# Patient Record
Sex: FEMALE | Race: BLACK OR AFRICAN AMERICAN | URBAN - METROPOLITAN AREA
[De-identification: names, ages, dates, MRNs, and addresses within clinical notes are randomized per-mention and may not be internally consistent; named-entity substitution may affect disease eponyms.]

---

## 2023-09-15 ENCOUNTER — OFFICE VISIT (OUTPATIENT)
Age: 38
End: 2023-09-15
Payer: COMMERCIAL

## 2023-09-15 VITALS
RESPIRATION RATE: 16 BRPM | DIASTOLIC BLOOD PRESSURE: 70 MMHG | OXYGEN SATURATION: 98 % | HEIGHT: 67 IN | HEART RATE: 99 BPM | SYSTOLIC BLOOD PRESSURE: 122 MMHG | BODY MASS INDEX: 25.43 KG/M2 | WEIGHT: 162 LBS

## 2023-09-15 DIAGNOSIS — R42 VERTIGO: ICD-10-CM

## 2023-09-15 DIAGNOSIS — H91.91 DECREASED HEARING, RIGHT: Primary | ICD-10-CM

## 2023-09-15 DIAGNOSIS — H93.11 RIGHT-SIDED TINNITUS: ICD-10-CM

## 2023-09-15 PROCEDURE — 99202 OFFICE O/P NEW SF 15 MIN: CPT | Performed by: OTOLARYNGOLOGY

## 2023-09-15 RX ORDER — LORATADINE 10 MG/1
CAPSULE, LIQUID FILLED ORAL
COMMUNITY
Start: 2023-01-01

## 2023-09-15 RX ORDER — NORETHINDRONE ACETATE AND ETHINYL ESTRADIOL 1.5; 3 MG/1; UG/1
1 TABLET ORAL DAILY
COMMUNITY
Start: 2023-08-18

## 2023-09-15 NOTE — PROGRESS NOTES
Otolaryngology-Head and Neck Surgery  New Patient Visit     Patient: Jose Alberto Godinez  YOB: 1985  MRN: [id number]  Date of Service: 9/15/2023    Chief Complaint:   Chief Complaint   Patient presents with    New Patient     Clogged ears    Dizziness         History of Present Illness: Jose Alberto Godinez is a 34 y.o. female who presents today for discussion of her ear    About 1 month ago developed dizziness with nausea and emesis lasting for hours  She was left with right ear tinnitus, hearing distortion and plugging    Seen in urgent care x 2 and given Rx for steroids and antibiotics, unsure which     Did not seem to help much    No pain  No otorrhea  No prior ear infection hx     Past Medical History:  No past medical history on file. Past Surgical History:   No past surgical history on file. Medications:   No current outpatient medications    Allergies:   Not on File    Social History:         Family History:  No family history on file. Review of Systems:    Consitutional: denies fever, excessive weight gain or loss. Eyes: denies diplopia, eye pain. Integumentary: denies new concerning skin lesions. Ears, Nose, Mouth, Throat: denies except as per HPI. Endocrine: denies hot or cold intolerance, increased thirst.  Respiratory: denies cough, hemoptysis, wheezing  Gastrointestinal: denies trouble swallowing, nausea, emesis, regurgitation  Musculoskeletal: denies muscle weakness or wasting  Cardiovascular: denies chest pain, shortness of breath  Neurologic: denies seizures, numbness or tingling, syncope  Hematologic: denies easy bleeding or bruising    Physical Examination  There were no vitals taken for this visit. General: Comfortable, pleasant, appears stated age  Voice: Strong, speaking in full sentences, no stridor    Face: No masses or lesions, facial strength symmetric   Ears: External ears unremarkable. Bilateral ear canal clear. Tympanic membrane clear and intact, with visible landmarks.

## 2023-09-18 ENCOUNTER — OFFICE VISIT (OUTPATIENT)
Age: 38
End: 2023-09-18
Payer: COMMERCIAL

## 2023-09-18 VITALS
HEART RATE: 94 BPM | OXYGEN SATURATION: 99 % | BODY MASS INDEX: 25.9 KG/M2 | SYSTOLIC BLOOD PRESSURE: 126 MMHG | WEIGHT: 165 LBS | DIASTOLIC BLOOD PRESSURE: 70 MMHG | RESPIRATION RATE: 16 BRPM | HEIGHT: 67 IN

## 2023-09-18 DIAGNOSIS — H90.3 ASYMMETRIC SNHL (SENSORINEURAL HEARING LOSS): ICD-10-CM

## 2023-09-18 DIAGNOSIS — H93.11 RIGHT-SIDED TINNITUS: ICD-10-CM

## 2023-09-18 DIAGNOSIS — H90.41 SENSORINEURAL HEARING LOSS (SNHL) OF RIGHT EAR WITH UNRESTRICTED HEARING OF LEFT EAR: Primary | ICD-10-CM

## 2023-09-18 DIAGNOSIS — H91.21 SUDDEN RIGHT HEARING LOSS: Primary | ICD-10-CM

## 2023-09-18 PROCEDURE — 92557 COMPREHENSIVE HEARING TEST: CPT | Performed by: AUDIOLOGIST

## 2023-09-18 PROCEDURE — 92550 TYMPANOMETRY & REFLEX THRESH: CPT | Performed by: AUDIOLOGIST

## 2023-09-18 PROCEDURE — 99213 OFFICE O/P EST LOW 20 MIN: CPT | Performed by: OTOLARYNGOLOGY

## 2023-09-18 NOTE — PROGRESS NOTES
Ida Beck, a 34y.o. year old female, was seen in ENT clinic today for a hearing evaluation on referral from Dr. Evette Chong. Patient complains of tinnitus. She also previously saw Dr. Evette Chong on 9- for dizziness; she reports this is resolved now but is still experiencing issues hearing. She reports \"white noise,\" \"echoes,\" and \"tones\" in the right ear and states that her left ear sounds \"muffled. \"    Otoscopy: normal external ear canals and visible tympanic membranes, bilaterally. Tympanometry: RE Type A, normal  LE Type A, normal    Acoustic reflexes, 500-4000Hz:    RE ipsi: absent at all tested frequencies       LE ipsi: present 500-2000Hz         RE contra (phone L): present 500-2000Hz      LE contra (phone R): absent at all tested frequencies    SRT: RE Speech Awareness Threshold (SAT) was observed at 65 dBHL   LE Speech Reception Threshold (SRT) was obtained at 5 dBHL    WRS: RE Poor in quiet when words were presented at 100 dBHL. (60 dBHL contralateral masking)  WRS: LE Excellent in quiet when words were presented at 45 dBHL. Pure tone audiometry:  RE: flat severe sensorineural hearing loss (moderate at 250Hz)  LE: WNL    sensorineural hearing loss right. Андрей test negative at 1000Hz    Impressions:  hearing loss requiring medical/otologic and audiologic follow-up    Plan:  Follow-up with ENT. Refer for CROS or CI evaluation following clearance. Repeat audiogram 1 year or sooner if change is noted.     Ary aNscimento   Doctor of Audiology

## 2023-09-19 RX ORDER — PREDNISONE 20 MG/1
TABLET ORAL
Qty: 28 TABLET | Refills: 0 | Status: SHIPPED | OUTPATIENT
Start: 2023-09-19

## 2023-09-19 NOTE — PROGRESS NOTES
Otolaryngology-Head and Neck Surgery  Follow Up Patient Visit     Patient: Monse Barron  YOB: 1985  MRN: [id number]  Date of Service: 9/18/2023    Chief Complaint:   Chief Complaint   Patient presents with    Follow-up     Hearing test     Interval hx 9/19/2023  Had audiogram    History of Present Illness: Monse Barron is a 34 y.o. female who presents today for discussion of her ear    About 1 month ago developed dizziness with nausea and emesis lasting for hours  She was left with right ear tinnitus, hearing distortion and plugging    Seen in urgent care x 2 and given Rx for steroids and antibiotics, unsure which     Did not seem to help much    No pain  No otorrhea  No prior ear infection hx     Past Medical History:  Past Medical History:   Diagnosis Date    Hearing loss     When I was around 6yrs old the pediatricia saw scarring in one of my ears and said it was from ear infections. Though before then, my mother wasnt aware of me having any. He said I probably had some hearing loss but was not tested. Hearing seemed fine. Past Surgical History:   History reviewed. No pertinent surgical history. Medications:   Current Outpatient Medications   Medication Instructions    JUNEL 1.5/30 1.5-30 MG-MCG TABS 1 tablet, Oral, DAILY    loratadine (CLARITIN) 10 MG capsule No dose, route, or frequency recorded. Allergies:   No Known Allergies    Social History:   Social History     Tobacco Use    Smoking status: Never    Smokeless tobacco: Never   Substance Use Topics    Alcohol use: Never    Drug use: Never        Family History:  Family History   Problem Relation Age of Onset    Cancer Father     Cancer Maternal Grandfather     Heart Failure Maternal Grandfather     Hearing Loss Maternal Grandmother     Diabetes Paternal Grandfather     Heart Failure Paternal Grandfather        Review of Systems:    Consitutional: denies fever, excessive weight gain or loss.   Eyes: denies diplopia, eye

## 2023-09-25 ENCOUNTER — HOSPITAL ENCOUNTER (OUTPATIENT)
Facility: HOSPITAL | Age: 38
Discharge: HOME | End: 2023-09-28
Attending: OTOLARYNGOLOGY
Payer: COMMERCIAL

## 2023-09-25 VITALS — WEIGHT: 162 LBS | BODY MASS INDEX: 25.37 KG/M2

## 2023-09-25 DIAGNOSIS — H91.21 SUDDEN RIGHT HEARING LOSS: ICD-10-CM

## 2023-09-25 DIAGNOSIS — H90.3 ASYMMETRIC SNHL (SENSORINEURAL HEARING LOSS): ICD-10-CM

## 2023-09-25 PROCEDURE — A9579 GAD-BASE MR CONTRAST NOS,1ML: HCPCS | Performed by: RADIOLOGY

## 2023-09-25 PROCEDURE — 70553 MRI BRAIN STEM W/O & W/DYE: CPT

## 2023-09-25 PROCEDURE — 6360000004 HC RX CONTRAST MEDICATION: Performed by: RADIOLOGY

## 2023-09-25 RX ADMIN — GADOTERIDOL 14 ML: 279.3 INJECTION, SOLUTION INTRAVENOUS at 08:09

## 2023-10-06 ENCOUNTER — OFFICE VISIT (OUTPATIENT)
Age: 38
End: 2023-10-06
Payer: COMMERCIAL

## 2023-10-06 VITALS
RESPIRATION RATE: 16 BRPM | DIASTOLIC BLOOD PRESSURE: 66 MMHG | BODY MASS INDEX: 25.27 KG/M2 | HEART RATE: 99 BPM | HEIGHT: 67 IN | SYSTOLIC BLOOD PRESSURE: 122 MMHG | WEIGHT: 161 LBS | OXYGEN SATURATION: 99 %

## 2023-10-06 DIAGNOSIS — H91.21 SUDDEN RIGHT HEARING LOSS: Primary | ICD-10-CM

## 2023-10-06 DIAGNOSIS — H90.3 ASYMMETRIC SNHL (SENSORINEURAL HEARING LOSS): ICD-10-CM

## 2023-10-06 DIAGNOSIS — H93.11 RIGHT-SIDED TINNITUS: ICD-10-CM

## 2023-10-06 PROCEDURE — 69801 INCISE INNER EAR: CPT | Performed by: OTOLARYNGOLOGY

## 2023-10-06 PROCEDURE — 99213 OFFICE O/P EST LOW 20 MIN: CPT | Performed by: OTOLARYNGOLOGY

## 2023-10-10 NOTE — PROGRESS NOTES
Otolaryngology-Head and Neck Surgery  Follow Up Patient Visit     Patient: Joe Mancini  YOB: 1985  MRN: [id number]  Date of Service: 10/6/2023    Chief Complaint:   Chief Complaint   Patient presents with    Follow-up     Sudden right hearing loss     Interval hx 10/6/2023  Completed PO prednisone without change in hearing  Had MRI IAC     History of Present Illness: Joe Mancini is a 34 y.o. female who presents today for discussion of her ear    About 1 month ago developed dizziness with nausea and emesis lasting for hours  She was left with right ear tinnitus, hearing distortion and plugging    Seen in urgent care x 2 and given Rx for steroids and antibiotics, unsure which     Did not seem to help much    No pain  No otorrhea  No prior ear infection hx     Past Medical History:  Past Medical History:   Diagnosis Date    Hearing loss     When I was around 6yrs old the pediatricia saw scarring in one of my ears and said it was from ear infections. Though before then, my mother wasnt aware of me having any. He said I probably had some hearing loss but was not tested. Hearing seemed fine. Past Surgical History:   History reviewed. No pertinent surgical history. Medications:   Current Outpatient Medications   Medication Instructions    JUNEL 1.5/30 1.5-30 MG-MCG TABS 1 tablet, Oral, DAILY    loratadine (CLARITIN) 10 MG capsule No dose, route, or frequency recorded.     predniSONE (DELTASONE) 20 MG tablet Take 60 mg (3 tabs) on days 1-7, then take 40 mg x 2 days, 20 mg x 2 days, 10 mg (1/2 tab) x 2 days then stop       Allergies:   No Known Allergies    Social History:   Social History     Tobacco Use    Smoking status: Never    Smokeless tobacco: Never   Substance Use Topics    Alcohol use: Never    Drug use: Never        Family History:  Family History   Problem Relation Age of Onset    Cancer Father     Cancer Maternal Grandfather     Heart Failure Maternal Grandfather     Hearing Loss Maternal

## 2023-10-11 RX ORDER — DEXAMETHASONE SODIUM PHOSPHATE 10 MG/ML
10 INJECTION INTRAMUSCULAR; INTRAVENOUS ONCE
Qty: 1 ML | Refills: 0
Start: 2023-10-11 | End: 2023-10-11

## 2023-10-13 ENCOUNTER — OFFICE VISIT (OUTPATIENT)
Age: 38
End: 2023-10-13

## 2023-10-13 VITALS
RESPIRATION RATE: 16 BRPM | SYSTOLIC BLOOD PRESSURE: 124 MMHG | HEIGHT: 67 IN | DIASTOLIC BLOOD PRESSURE: 60 MMHG | OXYGEN SATURATION: 98 % | HEART RATE: 99 BPM | BODY MASS INDEX: 25.27 KG/M2 | WEIGHT: 161 LBS

## 2023-10-13 DIAGNOSIS — H91.21 SUDDEN RIGHT HEARING LOSS: Primary | ICD-10-CM

## 2023-10-13 NOTE — PROGRESS NOTES
Tabatha Latesha  1985  [id number]    10/13/2023    PROCEDURE: Right intratympanic steroid injection     Preoperative diagnosis: Sudden R SNHL   Postoperative diagnosis: Same as above     Risks, benefits and alternatives were reviewed including but not limited to risk of bleeding, infection, vertigo, TM perforation, hearing loss, no improvement in hearing. Informed consent was obtained and the patient agreed to the procedure. Under microscopy, the R  ear was inspected with a speculum. Any obstructive cerumen or debris was removed. 0.5 ml of dexamethasone 10 mg/ml was infiltrated into the R ear through the pre-existing perforation. The patient was positioned supine with head turned for 15-20 minutes. The patient tolerated the procedure well.     Ania Barajas MD   [address] [place] [address] ENT & Allergy  [address] [place] [address] [address] [place] [address]  Office Phone: [phone]

## 2023-10-20 ENCOUNTER — OFFICE VISIT (OUTPATIENT)
Age: 38
End: 2023-10-20

## 2023-10-20 VITALS
SYSTOLIC BLOOD PRESSURE: 118 MMHG | DIASTOLIC BLOOD PRESSURE: 64 MMHG | OXYGEN SATURATION: 98 % | BODY MASS INDEX: 25.9 KG/M2 | HEART RATE: 86 BPM | RESPIRATION RATE: 16 BRPM | HEIGHT: 67 IN | WEIGHT: 165 LBS

## 2023-10-20 DIAGNOSIS — H91.21 SUDDEN RIGHT HEARING LOSS: Primary | ICD-10-CM

## 2023-10-20 NOTE — PROGRESS NOTES
Diane Viera  1985  [id number]    10/20/2023    PROCEDURE: Right intratympanic steroid injection     Preoperative diagnosis: Sudden R SNHL   Postoperative diagnosis: Same as above     Risks, benefits and alternatives were reviewed including but not limited to risk of bleeding, infection, vertigo, TM perforation, hearing loss, no improvement in hearing. Informed consent was obtained and the patient agreed to the procedure. Under microscopy, the R  ear was inspected with a speculum. Any obstructive cerumen or debris was removed. A drop of phenol was applied to the ear drum inferiorly. 0.5 ml of dexamethasone 10 mg/ml was infiltrated into the R ear. The patient was positioned supine with head turned for 15-20 minutes. The patient tolerated the procedure well.     Lindy Olivier MD   [address] [place] [address] ENT & Allergy  [address] [place] [address] [address] [place] [address]  Office Phone: [phone]

## 2023-12-11 ENCOUNTER — OFFICE VISIT (OUTPATIENT)
Age: 38
End: 2023-12-11
Payer: COMMERCIAL

## 2023-12-11 VITALS
OXYGEN SATURATION: 98 % | HEART RATE: 92 BPM | DIASTOLIC BLOOD PRESSURE: 78 MMHG | BODY MASS INDEX: 25.9 KG/M2 | HEIGHT: 67 IN | SYSTOLIC BLOOD PRESSURE: 118 MMHG | WEIGHT: 165 LBS

## 2023-12-11 DIAGNOSIS — H90.41 SENSORINEURAL HEARING LOSS (SNHL) OF RIGHT EAR WITH UNRESTRICTED HEARING OF LEFT EAR: Primary | ICD-10-CM

## 2023-12-11 DIAGNOSIS — H90.3 ASYMMETRIC SNHL (SENSORINEURAL HEARING LOSS): ICD-10-CM

## 2023-12-11 DIAGNOSIS — H93.11 RIGHT-SIDED TINNITUS: ICD-10-CM

## 2023-12-11 PROCEDURE — 99213 OFFICE O/P EST LOW 20 MIN: CPT | Performed by: OTOLARYNGOLOGY

## 2023-12-11 PROCEDURE — 92557 COMPREHENSIVE HEARING TEST: CPT | Performed by: AUDIOLOGIST

## 2023-12-11 NOTE — PROGRESS NOTES
discussed possibility of CROS or cochlear implant evaluation.         Ary Montero  Audiologist    Chart CC'd to: Nubia Cox MD      Degree of   Hearing Sensitivity dB Range   Within Normal Limits (WNL) 0 - 20   Mild 20 - 40   Moderate 40 - 55   Moderately-Severe 55 - 70   Severe 70 - 90   Profound 90 +

## 2023-12-12 NOTE — PROGRESS NOTES
MRI.      Assessment and Plan:   Sudden R SNHL  Asymmetric SNHL  R tinnitus  - Discussed ear exam is normal without effusion or issue to account for hearing symptoms  - Audiogram shows significant R SNHL   - No sig improvement with PO prednisone or IT steroid injection  - Low change hearing may improve at this point  - Discussed options including cont obs, role of CROS aid, consideration of CI  - Will arrange referral to [facility] otology to discuss CI further      The patient was instructed to return to clinic if no improvement or progression of symptoms. Signs to watch out for reviewed.       Erendira Fox MD   [address] [place] [address] ENT & Allergy  [address] [place] [address] [address]  [facility] [address]  Office Phone: [phone]

## 2024-01-05 ENCOUNTER — TELEPHONE (OUTPATIENT)
Age: 39
End: 2024-01-05

## 2024-01-17 ENCOUNTER — TELEPHONE (OUTPATIENT)
Facility: CLINIC | Age: 39
End: 2024-01-17

## 2024-01-17 NOTE — TELEPHONE ENCOUNTER
----- Message from Lillie Ira sent at 1/16/2024  4:05 PM EST -----  Subject: Appointment Request    Reason for Call: New Patient/New to Provider Appointment needed: New   Patient Request Appointment    QUESTIONS    Reason for appointment request? No appointments available during search     Additional Information for Provider? patient would like to be a new   patient with Dr. Walden. please call her back if that can or cannot   happen.   ---------------------------------------------------------------------------  --------------  CALL BACK INFO  [phone]; OK to leave message on voicemail  ---------------------------------------------------------------------------  --------------  SCRIPT ANSWERS

## 2024-02-26 ENCOUNTER — OFFICE VISIT (OUTPATIENT)
Age: 39
End: 2024-02-26
Payer: COMMERCIAL

## 2024-02-26 VITALS
BODY MASS INDEX: 25.27 KG/M2 | HEIGHT: 67 IN | RESPIRATION RATE: 16 BRPM | WEIGHT: 161 LBS | OXYGEN SATURATION: 99 % | DIASTOLIC BLOOD PRESSURE: 74 MMHG | SYSTOLIC BLOOD PRESSURE: 128 MMHG | HEART RATE: 90 BPM

## 2024-02-26 DIAGNOSIS — H91.21 SUDDEN RIGHT HEARING LOSS: Primary | ICD-10-CM

## 2024-02-26 DIAGNOSIS — H93.8X2 PLUGGED FEELING IN EAR, LEFT: ICD-10-CM

## 2024-02-26 DIAGNOSIS — H90.3 ASYMMETRIC SNHL (SENSORINEURAL HEARING LOSS): ICD-10-CM

## 2024-02-26 PROCEDURE — 99213 OFFICE O/P EST LOW 20 MIN: CPT | Performed by: OTOLARYNGOLOGY

## 2024-02-26 RX ORDER — FLUTICASONE PROPIONATE 50 MCG
1 SPRAY, SUSPENSION (ML) NASAL DAILY
Qty: 16 G | Refills: 2 | Status: SHIPPED | OUTPATIENT
Start: 2024-02-26

## 2024-02-26 RX ORDER — FLUOCINOLONE ACETONIDE 0.11 MG/ML
OIL AURICULAR (OTIC)
Qty: 1 EACH | Refills: 0 | Status: SHIPPED | OUTPATIENT
Start: 2024-02-26

## 2024-02-27 NOTE — PROGRESS NOTES
Otolaryngology-Head and Neck Surgery  Follow Up Patient Visit     Patient: Judy Hardin  YOB: 1985  MRN: [id number]  Date of Service: 2/26/2024        Chief Complaint:   Chief Complaint   Patient presents with    Follow-up    Ear Problem     Right ear     Interval hx 2/26/2024  Hearing unchanged in the right ear  Has a follow-up with [facility] otology in the next couple of months    About a month ago she tried using earplugs in her left ear while at work to help with hyperacusis  Immediately thereafter she developed left ear itching and left ear plugging  Despite stopping use of the earbud, she continues to have intermittent itching and plugging  Hearing seems to be normal    Interval hx 12/10/2023  Completed PO prednisone and 3 IT steroid injections  Does not feel much change     Interval hx 10/6/2023  Completed PO prednisone without change in hearing  Had MRI IAC     History of Present Illness: Judy Hardin is a 38 y.o. female who presents today for discussion of her ear    About 1 month ago developed dizziness with nausea and emesis lasting for hours  She was left with right ear tinnitus, hearing distortion and plugging    Seen in urgent care x 2 and given Rx for steroids and antibiotics, unsure which     Did not seem to help much    No pain  No otorrhea  No prior ear infection hx     Past Medical History:  Past Medical History:   Diagnosis Date    Hearing loss     When I was around 6yrs old the pediatricia saw scarring in one of my ears and said it was from ear infections. Though before then, my mother wasnt aware of me having any. He said I probably had some hearing loss but was not tested. Hearing seemed fine.       Past Surgical History:   History reviewed. No pertinent surgical history.    Medications:   Current Outpatient Medications   Medication Instructions    fluocinolone (DERMOTIC) 0.01 % OIL oil 1-2 drops left ear BID x 7 days, then use sparingly every few weeks PRN itching    fluticasone

## 2024-06-17 ENCOUNTER — OFFICE VISIT (OUTPATIENT)
Facility: CLINIC | Age: 39
End: 2024-06-17
Payer: COMMERCIAL

## 2024-06-17 VITALS
WEIGHT: 159 LBS | OXYGEN SATURATION: 97 % | TEMPERATURE: 99 F | HEIGHT: 68 IN | HEART RATE: 97 BPM | SYSTOLIC BLOOD PRESSURE: 116 MMHG | BODY MASS INDEX: 24.1 KG/M2 | DIASTOLIC BLOOD PRESSURE: 77 MMHG

## 2024-06-17 DIAGNOSIS — Z00.01 ENCOUNTER FOR WELL ADULT EXAM WITH ABNORMAL FINDINGS: ICD-10-CM

## 2024-06-17 DIAGNOSIS — H90.41 SENSORINEURAL HEARING LOSS (SNHL) OF RIGHT EAR WITH UNRESTRICTED HEARING OF LEFT EAR: ICD-10-CM

## 2024-06-17 DIAGNOSIS — J06.9 ACUTE URI: Primary | ICD-10-CM

## 2024-06-17 PROBLEM — H90.5 SENSORINEURAL HEARING LOSS (SNHL) OF RIGHT EAR: Status: ACTIVE | Noted: 2024-06-17

## 2024-06-17 PROCEDURE — 99203 OFFICE O/P NEW LOW 30 MIN: CPT | Performed by: INTERNAL MEDICINE

## 2024-06-17 RX ORDER — VIT C/ZINC CITRATE/ELDERBERRY 45 MG-50MG
2 TABLET,CHEWABLE ORAL DAILY
COMMUNITY

## 2024-06-17 RX ORDER — BIOTIN 1000 MCG
2 TABLET,CHEWABLE ORAL DAILY
COMMUNITY

## 2024-06-17 RX ORDER — GUAIFENESIN 400 MG/1
400 TABLET ORAL 4 TIMES DAILY PRN
COMMUNITY

## 2024-06-17 ASSESSMENT — ENCOUNTER SYMPTOMS
SHORTNESS OF BREATH: 0
NAUSEA: 0
DIARRHEA: 0
COUGH: 0
VOMITING: 0
ABDOMINAL PAIN: 0

## 2024-06-17 ASSESSMENT — PATIENT HEALTH QUESTIONNAIRE - PHQ9
SUM OF ALL RESPONSES TO PHQ QUESTIONS 1-9: 0
SUM OF ALL RESPONSES TO PHQ9 QUESTIONS 1 & 2: 0
SUM OF ALL RESPONSES TO PHQ QUESTIONS 1-9: 0
2. FEELING DOWN, DEPRESSED OR HOPELESS: NOT AT ALL
SUM OF ALL RESPONSES TO PHQ QUESTIONS 1-9: 0
1. LITTLE INTEREST OR PLEASURE IN DOING THINGS: NOT AT ALL
SUM OF ALL RESPONSES TO PHQ QUESTIONS 1-9: 0

## 2024-06-17 NOTE — ASSESSMENT & PLAN NOTE
Viral nature of the disease discussed with the patient.  Advised sinus rinse and saline gargle. Can take Tylenol or aleve for discomfort,Delsym for cough ,zyrtec or allegra or coricidine  for congestion.Encouraged to do steam inhalation and increasing fluid intake.

## 2024-06-17 NOTE — PROGRESS NOTES
Chief Complaint   Patient presents with    New Patient    /77 (Site: Right Upper Arm, Position: Sitting, Cuff Size: Small Adult)   Pulse 97   Temp 99 °F (37.2 °C) (Oral)   Ht 1.715 m (5' 7.5\")   Wt 72.1 kg (159 lb)   SpO2 97%   BMI 24.54 kg/m²  1. Have you been to the ER, urgent care clinic since your last visit?  Hospitalized since your last visit?No    2. Have you seen or consulted any other health care providers outside of the [facility] since your last visit?  Include any pap smears or colon screening. No    
    Hearing Loss Maternal Cousin     Learning Disabilities Maternal Cousin     Miscarriages / Stillbirths Maternal Aunt     Osteoporosis Maternal Aunt         Past Surgical History:   Procedure Laterality Date    EYE SURGERY  08/1985    Corrective eye surgery for strabismus       Social History     Tobacco Use    Smoking status: Never    Smokeless tobacco: Never   Substance Use Topics    Alcohol use: Never    Drug use: Never         Review of Systems   Constitutional:  Negative for appetite change and fatigue.   HENT:  Positive for congestion and hearing loss.    Eyes:  Negative for visual disturbance.   Respiratory:  Negative for cough and shortness of breath.    Cardiovascular:  Negative for chest pain and leg swelling.   Gastrointestinal:  Negative for abdominal pain, diarrhea, nausea and vomiting.   Genitourinary:  Negative for dysuria.   Skin:  Negative for rash.   Neurological:  Negative for tremors and headaches.   Psychiatric/Behavioral:  The patient is not nervous/anxious.        /77 (Site: Right Upper Arm, Position: Sitting, Cuff Size: Small Adult)   Pulse 97   Temp 99 °F (37.2 °C) (Oral)   Ht 1.715 m (5' 7.5\")   Wt 72.1 kg (159 lb)   SpO2 97%   BMI 24.54 kg/m²      Physical Exam  Vitals and nursing note reviewed.    Gen:  Not  in no acute distress. Well built and nourished.  HEENT:  Chisana conjunctivae, SARAI, EOMI, hearing decreased rt. -ve sinus tenderness.  Mouth: Moist mucous membranes. No TPC  Neck:  Supple, without masses, thyroid not enlarged  Resp:  No accessory muscle use, clear breath sounds without wheezes rales or rhonchi  Card:  No murmurs, normal S1, S2 without thrills, bruits or peripheral edema  Abd:  Soft, non-tender, non-distended, normoactive bowel sounds are present, no palpable organomegaly and no detectable hernias  Lymph:  No cervical or inguinal adenopathy  Musc:  No cyanosis or clubbing.   Gait: Normal  Skin:  No rashes or ulcers, skin turgor is good  Neuro: Alert and

## 2025-06-05 LAB
ALBUMIN SERPL-MCNC: 4.3 G/DL (ref 3.9–4.9)
ALP SERPL-CCNC: 52 IU/L (ref 44–121)
ALT SERPL-CCNC: 20 IU/L (ref 0–32)
AST SERPL-CCNC: 24 IU/L (ref 0–40)
BASOPHILS # BLD AUTO: 0 X10E3/UL (ref 0–0.2)
BASOPHILS NFR BLD AUTO: 0 %
BILIRUB SERPL-MCNC: 0.4 MG/DL (ref 0–1.2)
BUN SERPL-MCNC: 9 MG/DL (ref 6–24)
BUN/CREAT SERPL: 13 (ref 9–23)
CALCIUM SERPL-MCNC: 9.6 MG/DL (ref 8.7–10.2)
CHLORIDE SERPL-SCNC: 102 MMOL/L (ref 96–106)
CHOLEST SERPL-MCNC: 195 MG/DL (ref 100–199)
CO2 SERPL-SCNC: 22 MMOL/L (ref 20–29)
CREAT SERPL-MCNC: 0.7 MG/DL (ref 0.57–1)
EGFRCR SERPLBLD CKD-EPI 2021: 111 ML/MIN/1.73
EOSINOPHIL # BLD AUTO: 0 X10E3/UL (ref 0–0.4)
EOSINOPHIL NFR BLD AUTO: 1 %
ERYTHROCYTE [DISTWIDTH] IN BLOOD BY AUTOMATED COUNT: 12.8 % (ref 11.7–15.4)
GLOBULIN SER CALC-MCNC: 3.1 G/DL (ref 1.5–4.5)
GLUCOSE SERPL-MCNC: 102 MG/DL (ref 70–99)
HBA1C MFR BLD: 5.5 % (ref 4.8–5.6)
HCT VFR BLD AUTO: 42.5 % (ref 34–46.6)
HDLC SERPL-MCNC: 53 MG/DL
HGB BLD-MCNC: 13.3 G/DL (ref 11.1–15.9)
IMM GRANULOCYTES # BLD AUTO: 0 X10E3/UL (ref 0–0.1)
IMM GRANULOCYTES NFR BLD AUTO: 0 %
LDLC SERPL CALC-MCNC: 125 MG/DL (ref 0–99)
LYMPHOCYTES # BLD AUTO: 1.5 X10E3/UL (ref 0.7–3.1)
LYMPHOCYTES NFR BLD AUTO: 47 %
MCH RBC QN AUTO: 25.6 PG (ref 26.6–33)
MCHC RBC AUTO-ENTMCNC: 31.3 G/DL (ref 31.5–35.7)
MCV RBC AUTO: 82 FL (ref 79–97)
MONOCYTES # BLD AUTO: 0.3 X10E3/UL (ref 0.1–0.9)
MONOCYTES NFR BLD AUTO: 10 %
NEUTROPHILS # BLD AUTO: 1.3 X10E3/UL (ref 1.4–7)
NEUTROPHILS NFR BLD AUTO: 42 %
PLATELET # BLD AUTO: 221 X10E3/UL (ref 150–450)
POTASSIUM SERPL-SCNC: 4.5 MMOL/L (ref 3.5–5.2)
PROT SERPL-MCNC: 7.4 G/DL (ref 6–8.5)
RBC # BLD AUTO: 5.19 X10E6/UL (ref 3.77–5.28)
SODIUM SERPL-SCNC: 138 MMOL/L (ref 134–144)
TRIGL SERPL-MCNC: 96 MG/DL (ref 0–149)
TSH SERPL DL<=0.005 MIU/L-ACNC: 1.87 UIU/ML (ref 0.45–4.5)
VLDLC SERPL CALC-MCNC: 17 MG/DL (ref 5–40)
WBC # BLD AUTO: 3.1 X10E3/UL (ref 3.4–10.8)

## 2025-06-08 ENCOUNTER — RESULTS FOLLOW-UP (OUTPATIENT)
Facility: CLINIC | Age: 40
End: 2025-06-08

## 2025-07-05 ASSESSMENT — PATIENT HEALTH QUESTIONNAIRE - PHQ9
SUM OF ALL RESPONSES TO PHQ9 QUESTIONS 1 & 2: 2
SUM OF ALL RESPONSES TO PHQ QUESTIONS 1-9: 2
2. FEELING DOWN, DEPRESSED OR HOPELESS: SEVERAL DAYS
SUM OF ALL RESPONSES TO PHQ QUESTIONS 1-9: 2
1. LITTLE INTEREST OR PLEASURE IN DOING THINGS: SEVERAL DAYS
2. FEELING DOWN, DEPRESSED OR HOPELESS: SEVERAL DAYS
SUM OF ALL RESPONSES TO PHQ QUESTIONS 1-9: 2
SUM OF ALL RESPONSES TO PHQ QUESTIONS 1-9: 2
1. LITTLE INTEREST OR PLEASURE IN DOING THINGS: SEVERAL DAYS

## 2025-07-08 ENCOUNTER — TELEPHONE (OUTPATIENT)
Facility: CLINIC | Age: 40
End: 2025-07-08

## 2025-07-08 ENCOUNTER — OFFICE VISIT (OUTPATIENT)
Facility: CLINIC | Age: 40
End: 2025-07-08
Payer: COMMERCIAL

## 2025-07-08 VITALS
TEMPERATURE: 97.6 F | HEIGHT: 68 IN | BODY MASS INDEX: 24.31 KG/M2 | OXYGEN SATURATION: 98 % | DIASTOLIC BLOOD PRESSURE: 77 MMHG | HEART RATE: 96 BPM | SYSTOLIC BLOOD PRESSURE: 116 MMHG | WEIGHT: 160.4 LBS

## 2025-07-08 DIAGNOSIS — F41.9 ANXIETY AND DEPRESSION: ICD-10-CM

## 2025-07-08 DIAGNOSIS — D70.9 NEUTROPENIA, UNSPECIFIED TYPE: ICD-10-CM

## 2025-07-08 DIAGNOSIS — R73.09 ABNORMAL BLOOD SUGAR: ICD-10-CM

## 2025-07-08 DIAGNOSIS — F32.A ANXIETY AND DEPRESSION: ICD-10-CM

## 2025-07-08 DIAGNOSIS — E78.00 HYPERCHOLESTEROLEMIA: ICD-10-CM

## 2025-07-08 DIAGNOSIS — H90.41 SENSORINEURAL HEARING LOSS (SNHL) OF RIGHT EAR WITH UNRESTRICTED HEARING OF LEFT EAR: Primary | ICD-10-CM

## 2025-07-08 PROCEDURE — 99214 OFFICE O/P EST MOD 30 MIN: CPT | Performed by: INTERNAL MEDICINE

## 2025-07-08 RX ORDER — CLOBETASOL PROPIONATE 0.5 MG/G
CREAM TOPICAL 2 TIMES DAILY
COMMUNITY

## 2025-07-08 SDOH — ECONOMIC STABILITY: FOOD INSECURITY: WITHIN THE PAST 12 MONTHS, YOU WORRIED THAT YOUR FOOD WOULD RUN OUT BEFORE YOU GOT MONEY TO BUY MORE.: NEVER TRUE

## 2025-07-08 SDOH — ECONOMIC STABILITY: FOOD INSECURITY: WITHIN THE PAST 12 MONTHS, THE FOOD YOU BOUGHT JUST DIDN'T LAST AND YOU DIDN'T HAVE MONEY TO GET MORE.: NEVER TRUE

## 2025-07-08 ASSESSMENT — ENCOUNTER SYMPTOMS
VOMITING: 0
ABDOMINAL PAIN: 0
DIARRHEA: 0
SHORTNESS OF BREATH: 0
COUGH: 0
NAUSEA: 0

## 2025-07-08 NOTE — PROGRESS NOTES
Chief Complaint   Patient presents with    Follow-up    Discuss Labs       Have you been to the ER, urgent care clinic since your last visit?  Hospitalized since your last visit?   NO    Have you seen or consulted any other health care providers outside our system since your last visit?   NO    Have you had a mammogram?”   YES - Where:  1/2025        Vitals:    07/08/25 0805   BP: 116/77   Pulse: (!) 105   Temp: 97.6 °F (36.4 °C)   SpO2: 98%
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
